# Patient Record
(demographics unavailable — no encounter records)

---

## 2025-06-20 NOTE — PHYSICAL EXAM
[de-identified] : Examination of the left hip is as follows:  INSPECTION: no swelling, no ecchymosis, no erythema, no scars, no palpable masses.  PALPATION: tenderness, greater trochanteric tenderness, no groin tenderness ROM: pain with hip abduction, but flexion 120 degrees, extension 30 degrees, adduction 35 degrees, abduction 45 degrees, external rotation 45 degrees, internal rotation 45 degrees.  STRENGTH: flexion 5/5, extension 5/5, abduction 5/5, adduction 5/5.  VASCULAR: dorsalis pedis pulse: 2+, posterior tibialis pulse: 2+.  NEURO: motor exam 5/5 throughout, light touch intact throughout, no focal motor deficits.  GAIT: non-antalgic, patient ambulates without assistive device.  X-rays of the left hip is as follows: [facility] Hip with pelvis 2 view in the anteroposterior, lateral views: There are no fractures, subluxations or dislocations. No significant abnormalities are seen.

## 2025-06-20 NOTE — HISTORY OF PRESENT ILLNESS
[Sudden] : sudden [Dull/Aching] : dull/aching [Throbbing] : throbbing [Intermittent] : intermittent [Household chores] : household chores [Leisure] : leisure [Social interactions] : social interactions [Rest] : rest [Gradual] : gradual [10] : 10 [8] : 8 [Retired] : Work status: retired [Steroid] : Steroid [de-identified] : Patient here for left hip. Patient states the pain began two years ago. NKI. Patient reports constant sharp pain over greater trochanter, notes occasional pain over glutes and occasional radiation of pain down the left leg with associated numbness. Denies groin pain.  Patient saw Dr. Adrian Russo at [facility] where she had x-rays of the hip on 7/18/25. Patient states that she has left greater trochanter csi on 4/10/25 with great temporary relief of pain until the pain recurred 5/4/25. Patient states that she went to PT for 5 weeks with mild improvement, last visit 10/2024. Denies taking occasional Aleve and ice. Notes she has been stretching and using red light therapy with relief. Patient came into the office ambulating without assistive devices.  [] : Post Surgical Visit: no [FreeTextEntry1] : Left hip [FreeTextEntry3] : two years ago [FreeTextEntry5] : NKI [de-identified] : Movement  [de-identified] : 7/18/24 [de-identified] : Dr. Adrian Russo [de-identified] : X-rays of left hip at [facility] on 7/18/24 [de-identified] : 4/10/25

## 2025-06-20 NOTE — ASSESSMENT
[FreeTextEntry1] : 61 yo female presenting today with left greater trochanteric hip bursitis. X-rays negative. Recommend non-surgical management. Patient notes that her pain overall is tolerable at this time. -Discussed with patient that in the future if her pain worsens that she will be indicated for left greater trochanter csi, patient expressed understanding -Discussed with patient in the future if her pain continues to persist with non-surgical modalities that she may be indicated for left hip arthroscopy with greater trochanteric hip bursectomy, understanding expressed -Activities as tolerated -Rest, ice, compression, elevation, NSAIDs PRN for pain. -All questions answered -F/u PRN   The diagnosis was explained in detail. The potential non-surgical and surgical treatments were reviewed. The relative risks and benefits of each option were considered relative to the patients age, activity level, medical history, symptom severity and previously attempted treatments.   The patient was advised to consult with their primary medical provider prior to initiation of any new medications to reduce the risk of adverse effects specific to their long-term home medications and medical history.   The patient's current medications management of their orthopedic diagnosis was reviewed today:   1) We discussed a comprehensive treatment plan that included possible pharmaceutical management involving the use of prescription strength medications including but not limited to options as oral Naprosyn 500mg BID, once daily Meloxicam 15 mg, or 500-650 mg Tylenol versus over-the-counter oral medications and topical prescriptions NSAID Pennsaid vs over the counter Voltaren gel.   2) There is a moderate risk of morbidity with further treatment, especially from use of prescription strength medications and possible side effects of these medications which include upset stomach with oral medications, skin reactions to topical medications and cardiac/renal issues with long term use.   3) I recommended that the patient follow-up with their medical physician to discuss any significant specific potential issues with long term medication use such as interactions with current medications or with exacerbation of underlying medical comorbidities.   4) The benefits and risks associated with use of injectable, oral or topical, prescription and over the counter anti-inflammatory medications were discussed with the patient. The patient voiced understanding of the risks including but not limited to bleeding, stroke, kidney dysfunction, heart disease, and were referred to the black box warning label for further information  Entered by Abraham Mills PA-C acting as scribe. Dr. Herring Attestation The documentation recorded by the scribe, in my presence, accurately reflects the service I, Dr. Herring, personally performed, and the decisions made by me with my edits as appropriate.